# Patient Record
Sex: FEMALE | Race: WHITE | NOT HISPANIC OR LATINO | Employment: UNEMPLOYED | RURAL
[De-identification: names, ages, dates, MRNs, and addresses within clinical notes are randomized per-mention and may not be internally consistent; named-entity substitution may affect disease eponyms.]

---

## 2017-10-10 ENCOUNTER — OFFICE VISIT (OUTPATIENT)
Dept: FAMILY MEDICINE CLINIC | Facility: CLINIC | Age: 42
End: 2017-10-10

## 2017-10-10 ENCOUNTER — TELEPHONE (OUTPATIENT)
Dept: FAMILY MEDICINE CLINIC | Facility: CLINIC | Age: 42
End: 2017-10-10

## 2017-10-10 VITALS
RESPIRATION RATE: 20 BRPM | WEIGHT: 143 LBS | HEART RATE: 94 BPM | OXYGEN SATURATION: 96 % | BODY MASS INDEX: 21.18 KG/M2 | SYSTOLIC BLOOD PRESSURE: 120 MMHG | TEMPERATURE: 98.7 F | DIASTOLIC BLOOD PRESSURE: 78 MMHG | HEIGHT: 69 IN

## 2017-10-10 DIAGNOSIS — Z13.1 SCREENING FOR DIABETES MELLITUS: ICD-10-CM

## 2017-10-10 DIAGNOSIS — Z13.29 SCREENING FOR THYROID DISORDER: ICD-10-CM

## 2017-10-10 DIAGNOSIS — Z13.220 SCREENING FOR LIPOID DISORDERS: ICD-10-CM

## 2017-10-10 DIAGNOSIS — N94.6 DYSMENORRHEA: ICD-10-CM

## 2017-10-10 DIAGNOSIS — B35.1 TOENAIL FUNGUS: Primary | ICD-10-CM

## 2017-10-10 DIAGNOSIS — B95.8 STAPH INFECTION: ICD-10-CM

## 2017-10-10 DIAGNOSIS — Z86.79 HISTORY OF HEART FAILURE: ICD-10-CM

## 2017-10-10 PROCEDURE — 99203 OFFICE O/P NEW LOW 30 MIN: CPT | Performed by: NURSE PRACTITIONER

## 2017-10-10 RX ORDER — FLUCONAZOLE 150 MG/1
150 TABLET ORAL ONCE
Qty: 1 TABLET | Refills: 0 | Status: SHIPPED | OUTPATIENT
Start: 2017-10-10 | End: 2017-10-10

## 2017-10-10 RX ORDER — SULFAMETHOXAZOLE AND TRIMETHOPRIM 800; 160 MG/1; MG/1
TABLET ORAL
COMMUNITY
Start: 2017-10-06 | End: 2017-10-27

## 2017-10-10 RX ORDER — LEVOFLOXACIN 500 MG/1
500 TABLET, FILM COATED ORAL DAILY
Qty: 10 TABLET | Refills: 0 | Status: SHIPPED | OUTPATIENT
Start: 2017-10-10 | End: 2017-10-20

## 2017-10-16 NOTE — PROGRESS NOTES
Subjective   Arlin Og is a 42 y.o. female. She presents today to [facility].  She has several different concerns today in the office.  1. She has a severe fungal left great toenail.  It has been this way for years.  She typically covers it with a bandaid.  2. She suffers from heavy, painful menstrual cycles.  Hx of tubal ligation.    3. She has chronic staph infections.  Most recently on her left lower leg.  Was given Bactrim through the ER, but the symptoms are not improving.  4. Hx of CHF in the past.  Has not seen cardiology or been evaluated.  Due for fasting labs and PAP smear.     Lower Extremity Issue   This is a chronic problem. The current episode started more than 1 year ago. The problem occurs intermittently. The problem has been waxing and waning. Associated symptoms include abdominal pain and a rash. Pertinent negatives include no anorexia, change in bowel habit, chest pain, chills, congestion, coughing, diaphoresis, fatigue, fever, headaches, joint swelling, myalgias, nausea, neck pain, numbness, sore throat, swollen glands, urinary symptoms, vertigo, visual change, vomiting or weakness. The treatment provided no relief.        The following portions of the patient's history were reviewed and updated as appropriate: allergies, current medications, past family history, past medical history, past social history, past surgical history and problem list.    Review of Systems   Constitutional: Negative.  Negative for chills, diaphoresis, fatigue and fever.   HENT: Negative for congestion and sore throat.    Respiratory: Negative.  Negative for cough and shortness of breath.    Cardiovascular: Negative.  Negative for chest pain.   Gastrointestinal: Positive for abdominal pain. Negative for anorexia, change in bowel habit, nausea and vomiting.   Genitourinary: Positive for menstrual problem and pelvic pain. Negative for decreased urine volume, difficulty urinating, dyspareunia, dysuria, flank pain,  frequency, genital sores, hematuria, urgency, vaginal bleeding, vaginal discharge and vaginal pain.   Musculoskeletal: Negative for joint swelling, myalgias and neck pain.   Skin: Positive for color change and rash. Negative for pallor and wound.   Neurological: Negative for vertigo, weakness, numbness and headaches.       Objective   Physical Exam   Constitutional: She is oriented to person, place, and time. Vital signs are normal. She appears well-developed and well-nourished. No distress.   HENT:   Head: Normocephalic.   Right Ear: External ear normal.   Left Ear: External ear normal.   Nose: Nose normal.   Mouth/Throat: Oropharynx is clear and moist.   Eyes: EOM are normal. Pupils are equal, round, and reactive to light.   Neck: Normal range of motion. Neck supple. No JVD present. No thyromegaly present.   Cardiovascular: Normal rate and regular rhythm.  Exam reveals no gallop and no friction rub.    No murmur heard.  Pulmonary/Chest: Effort normal and breath sounds normal. No respiratory distress. She has no wheezes. She has no rales.   Musculoskeletal: Normal range of motion.        Neurological: She is alert and oriented to person, place, and time.   Skin: Skin is warm and dry. Lesion (LLE) noted. No rash noted. She is not diaphoretic. There is erythema (LLE). No pallor.   Psychiatric: She has a normal mood and affect. Her behavior is normal. Judgment and thought content normal.   Nursing note and vitals reviewed.      Assessment/Plan   Arlin was seen today for establish care.    Diagnoses and all orders for this visit:    Toenail fungus  -     Ambulatory Referral to Podiatry    History of heart failure  -     XR Chest PA & Lateral  -     Ambulatory Referral to Cardiology    Dysmenorrhea  -     CBC (No Diff)  -     Comprehensive Metabolic Panel  -     Vitamin D 25 Hydroxy  -     US non-ob transvaginal  -     Ambulatory Referral to Gynecology    Screening for diabetes mellitus  -     Hemoglobin  A1c    Screening for lipoid disorders  -     Lipid Panel    Screening for thyroid disorder  -     TSH    Staph infection  -     levoFLOXacin (LEVAQUIN) 500 MG tablet; Take 1 tablet by mouth Daily for 10 days.  -     mupirocin (BACTROBAN) 2 % ointment; Intranasally twice daily x 1 month  -     fluconazole (DIFLUCAN) 150 MG tablet; Take 1 tablet by mouth 1 (One) Time for 1 dose.    Plenty of fluids.  Finish all antibiotics.  Diflucan PRN yeast symptoms.  Bactroban intranasally twice daily x 1 month.  Fasting labs.  X-ray following office visit.  Referral to cardiology for cardiac work up.  Referral to podiatry for significant left great toenail fungus.  Referral to GYN for menstrual problems.  Continue current medications.  Follow up in 3 months for routine follow up.  Follow up sooner for problems/concerns.  Patient verbalized understanding and agreement with plan of care.        This document has been electronically signed by YULI West on October 16, 2017 10:52 AM

## 2017-10-27 ENCOUNTER — OFFICE VISIT (OUTPATIENT)
Dept: PODIATRY | Facility: CLINIC | Age: 42
End: 2017-10-27

## 2017-10-27 VITALS — HEIGHT: 69 IN | BODY MASS INDEX: 21.18 KG/M2 | WEIGHT: 143 LBS | HEART RATE: 43 BPM | OXYGEN SATURATION: 99 %

## 2017-10-27 DIAGNOSIS — B35.1 ONYCHOMYCOSIS: Primary | ICD-10-CM

## 2017-10-27 DIAGNOSIS — L60.2 ONYCHOGRYPHOSIS: ICD-10-CM

## 2017-10-27 DIAGNOSIS — M79.675 GREAT TOE PAIN, LEFT: ICD-10-CM

## 2017-10-27 PROCEDURE — 11750 EXCISION NAIL&NAIL MATRIX: CPT | Performed by: PODIATRIST

## 2017-10-27 PROCEDURE — 99203 OFFICE O/P NEW LOW 30 MIN: CPT | Performed by: PODIATRIST

## 2017-10-27 NOTE — PROGRESS NOTES
Arlin Og  1975  42 y.o. female    Patient presents today with a complaint of left great toenail being fungal and the toe is painful and does not have the ROM that is use to.    10/27/2017  Chief Complaint   Patient presents with   • Left Foot - toenail issue           History of Present Illness    Arlin Og is a 42 y.o.female presents to clinic today with chief complaint of left great toe pain.  Pain is located to the great toenail.  States that approximately 1 year ago she dropped something very heavy on it.  Ever since that time the nail has not grown properly.  A gross thickened, discolored and crumbly.  It occasionally falls off.  She describes the pain as dull.  She rates it as a 3 out of 10.  She has done nothing to treat it besides wear a Band-Aid.  She has no other pedal complaints.          Past Medical History:   Diagnosis Date   • Coronary artery disease    • History of CHF (congestive heart failure)    • Pre-eclampsia          Past Surgical History:   Procedure Laterality Date   •  SECTION     • TUBAL ABDOMINAL LIGATION           Family History   Problem Relation Age of Onset   • Atrial fibrillation Mother    • Anxiety disorder Mother    • Hyperlipidemia Mother    • Depression Mother    • Hypertension Mother    • Obesity Mother    • Heart disease Mother    • Anxiety disorder Father    • Hypertension Father        No Known Allergies    Social History     Social History   • Marital status:      Spouse name: N/A   • Number of children: N/A   • Years of education: N/A     Occupational History   • Not on file.     Social History Main Topics   • Smoking status: Current Every Day Smoker     Packs/day: 0.50     Types: Cigarettes   • Smokeless tobacco: Never Used   • Alcohol use Yes      Comment: Occassionally   • Drug use: Yes     Special: Marijuana   • Sexual activity: Defer     Other Topics Concern   • Not on file     Social History Narrative         Current Outpatient  "Prescriptions   Medication Sig Dispense Refill   • mupirocin (BACTROBAN) 2 % ointment Intranasally twice daily x 1 month 60 g 1     No current facility-administered medications for this visit.          OBJECTIVE    Pulse (!) 43  Ht 69\" (175.3 cm)  Wt 143 lb (64.9 kg)  LMP 10/10/2017  SpO2 99%  BMI 21.12 kg/m2      Review of Systems   Constitutional: Positive for fatigue.   Musculoskeletal: Positive for arthralgias and joint swelling.        Foot pain  Joint pain   Neurological: Positive for numbness.   All other systems reviewed and are negative.        Constitutional: well developed, well nourished    HEENT: Normocephalic and atraumatic, normal hearing    Respiratory: Non labored respirations noted    LLE Exam:    Cardiovascular:    DP/PT pulses palpable    CFT brisk  to all digits  Skin temp is warm to warm from proximal tibia to distal digits  Pedal hair growth present.   No erythema or edema noted     Musculoskeletal:  Muscle strength is 5/5 for all muscle groups tested   ROM of the 1st MTP is full without pain or crepitus  ROM of the MTJ is full without pain or crepitus    ROM of the STJ is full without pain or crepitus    ROM of the ankle joint is full without pain or crepitus    POP to left hallux toenail  Rectus foot type     Dermatological:   Left hallux nail is thickened, discolored  Skin is warm, dry and intact    Webspaces 1-4 bilateral are clean, dry and intact.   No subcutaneous nodules or masses noted    No open wounds noted     Neurological:   Sensation intact to light touch    DTR intact    Psychiatric: A&O x 3 with normal mood and affect. NAD.       Nail Removal  Date/Time: 10/27/2017 2:06 PM  Performed by: CAROLIN LEROY  Authorized by: CAROLIN LEROY   Location: left foot  Location details: left big toe  Anesthesia: digital block    Anesthesia:  Local Anesthetic: lidocaine 2% without epinephrine    Sedation:  Patient sedated: no  Preparation: skin prepped with Betadine  Amount removed: " complete (Nail plate was loosened from underlying nailbed with blunt dissection and removed with a hemostat)  Nail matrix removed: complete (Phenol)  Removed nail replaced and anchored: no  Dressing: antibiotic ointment and dressing applied  Patient tolerance: Patient tolerated the procedure well with no immediate complications              ASSESSMENT AND PLAN    Arlin was seen today for toenail issue.    Diagnoses and all orders for this visit:    Onychomycosis    Onychogryphosis    Great toe pain, left    - Comprehensive foot and ankle exam performed  - Diagnosis, prevention and treatment of fungal toenails discussed with patient, including risks and potential benefits of nail avulsion both temporary and permanent versus simple debridement vs biopsy and tx with oral antifungals  - Patient elected for a total permanent nail avulsion  - Dispensed aftercare instruction sheet  - All questions were answered and the patient is in agreement with the current treatment plan.  - RTC in 2 weeks          This document has been electronically signed by Earle King DPM on October 27, 2017 2:04 PM     10/27/2017  2:04 PM

## 2017-11-01 ENCOUNTER — OFFICE VISIT (OUTPATIENT)
Dept: FAMILY MEDICINE CLINIC | Facility: CLINIC | Age: 42
End: 2017-11-01

## 2017-11-01 VITALS
OXYGEN SATURATION: 96 % | RESPIRATION RATE: 20 BRPM | WEIGHT: 146.5 LBS | TEMPERATURE: 98.1 F | HEART RATE: 80 BPM | HEIGHT: 69 IN | DIASTOLIC BLOOD PRESSURE: 78 MMHG | SYSTOLIC BLOOD PRESSURE: 120 MMHG | BODY MASS INDEX: 21.7 KG/M2

## 2017-11-01 DIAGNOSIS — Z23 NEEDS FLU SHOT: ICD-10-CM

## 2017-11-01 DIAGNOSIS — N94.6 DYSMENORRHEA: ICD-10-CM

## 2017-11-01 DIAGNOSIS — Z86.79 HISTORY OF HEART FAILURE: ICD-10-CM

## 2017-11-01 DIAGNOSIS — Z91.89 PNEUMOCOCCAL VACCINATION INDICATED: ICD-10-CM

## 2017-11-01 DIAGNOSIS — B95.8 STAPH INFECTION: Primary | ICD-10-CM

## 2017-11-01 PROCEDURE — 90471 IMMUNIZATION ADMIN: CPT | Performed by: NURSE PRACTITIONER

## 2017-11-01 PROCEDURE — 99214 OFFICE O/P EST MOD 30 MIN: CPT | Performed by: NURSE PRACTITIONER

## 2017-11-01 PROCEDURE — 90472 IMMUNIZATION ADMIN EACH ADD: CPT | Performed by: NURSE PRACTITIONER

## 2017-11-01 PROCEDURE — 90686 IIV4 VACC NO PRSV 0.5 ML IM: CPT | Performed by: NURSE PRACTITIONER

## 2017-11-01 PROCEDURE — 90732 PPSV23 VACC 2 YRS+ SUBQ/IM: CPT | Performed by: NURSE PRACTITIONER

## 2017-11-01 NOTE — PATIENT INSTRUCTIONS
Preventive Care 40-64 Years, Female  Preventive care refers to lifestyle choices and visits with your health care provider that can promote health and wellness.  WHAT DOES PREVENTIVE CARE INCLUDE?  · A yearly physical exam. This is also called an annual well check.  · Dental exams once or twice a year.  · Routine eye exams. Ask your health care provider how often you should have your eyes checked.  · Personal lifestyle choices, including:    Daily care of your teeth and gums.    Regular physical activity.    Eating a healthy diet.    Avoiding tobacco and drug use.    Limiting alcohol use.    Practicing safe sex.    Taking low-dose aspirin daily starting at age 50.    Taking vitamin and mineral supplements as recommended by your health care provider.  WHAT HAPPENS DURING AN ANNUAL WELL CHECK?  The services and screenings done by your health care provider during your annual well check will depend on your age, overall health, lifestyle risk factors, and family history of disease.  Counseling  Your health care provider may ask you questions about your:  · Alcohol use.  · Tobacco use.  · Drug use.  · Emotional well-being.  · Home and relationship well-being.  · Sexual activity.  · Eating habits.  · Work and work environment.  · Method of birth control.  · Menstrual cycle.  · Pregnancy history.  Screening  You may have the following tests or measurements:  · Height, weight, and BMI.  · Blood pressure.  · Lipid and cholesterol levels. These may be checked every 5 years, or more frequently if you are over 50 years old.  · Skin check.  · Lung cancer screening. You may have this screening every year starting at age 55 if you have a 30-pack-year history of smoking and currently smoke or have quit within the past 15 years.  · Fecal occult blood test (FOBT) of the stool. You may have this test every year starting at age 50.  · Flexible sigmoidoscopy or colonoscopy. You may have a sigmoidoscopy every 5 years or a colonoscopy  every 10 years starting at age 50.  · Hepatitis C blood test.  · Hepatitis B blood test.  · Sexually transmitted disease (STD) testing.  · Diabetes screening. This is done by checking your blood sugar (glucose) after you have not eaten for a while (fasting). You may have this done every 1-3 years.  · Mammogram. This may be done every 1-2 years. Talk to your health care provider about when you should start having regular mammograms. This may depend on whether you have a family history of breast cancer.  · BRCA-related cancer screening. This may be done if you have a family history of breast, ovarian, tubal, or peritoneal cancers.  · Pelvic exam and Pap test. This may be done every 3 years starting at age 21. Starting at age 30, this may be done every 5 years if you have a Pap test in combination with an HPV test.  · Bone density scan. This is done to screen for osteoporosis. You may have this scan if you are at high risk for osteoporosis.  Discuss your test results, treatment options, and if necessary, the need for more tests with your health care provider.  Vaccines   Your health care provider may recommend certain vaccines, such as:  · Influenza vaccine. This is recommended every year.  · Tetanus, diphtheria, and acellular pertussis (Tdap, Td) vaccine. You may need a Td booster every 10 years.  · Varicella vaccine. You may need this if you have not been vaccinated.  · Zoster vaccine. You may need this after age 60.  · Measles, mumps, and rubella (MMR) vaccine. You may need at least one dose of MMR if you were born in 1957 or later. You may also need a second dose.  · Pneumococcal 13-valent conjugate (PCV13) vaccine. You may need this if you have certain conditions and were not previously vaccinated.  · Pneumococcal polysaccharide (PPSV23) vaccine. You may need one or two doses if you smoke cigarettes or if you have certain conditions.  · Meningococcal vaccine. You may need this if you have certain  conditions.  · Hepatitis A vaccine. You may need this if you have certain conditions or if you travel or work in places where you may be exposed to hepatitis A.  · Hepatitis B vaccine. You may need this if you have certain conditions or if you travel or work in places where you may be exposed to hepatitis B.  · Haemophilus influenzae type b (Hib) vaccine. You may need this if you have certain conditions.  Talk to your health care provider about which screenings and vaccines you need and how often you need them.     This information is not intended to replace advice given to you by your health care provider. Make sure you discuss any questions you have with your health care provider.     Document Released: 01/13/2017 Document Reviewed: 01/13/2017  [employer] Interactive Patient Education ©2017 [employer] Inc.  Pneumococcal Vaccine, Polyvalent solution for injection  What is this medicine?  PNEUMOCOCCAL VACCINE, POLYVALENT (RACHANA mo LEOLA al vak SEEN, chari ee WALTER rose) is a vaccine to prevent pneumococcus bacteria infection. These bacteria are a major cause of ear infections, Strep throat infections, and serious pneumonia, meningitis, or blood infections worldwide. These vaccines help the body to produce antibodies (protective substances) that help your body defend against these bacteria. This vaccine is recommended for people 2 years of age and older with health problems. It is also recommended for all adults over 50 years old. This vaccine will not treat an infection.  This medicine may be used for other purposes; ask your health care provider or pharmacist if you have questions.  COMMON BRAND NAME(S): Pneumovax 23  What should I tell my health care provider before I take this medicine?  They need to know if you have any of these conditions:  -bleeding problems  -bone marrow or organ transplant  -cancer, Hodgkin's disease  -fever  -infection  -immune system problems  -low platelet count in the blood  -seizures  -an unusual  or allergic reaction to pneumococcal vaccine, diphtheria toxoid, other vaccines, latex, other medicines, foods, dyes, or preservatives  -pregnant or trying to get pregnant  -breast-feeding  How should I use this medicine?  This vaccine is for injection into a muscle or under the skin. It is given by a health care professional.  A copy of Vaccine Information Statements will be given before each vaccination. Read this sheet carefully each time. The sheet may change frequently.  Talk to your pediatrician regarding the use of this medicine in children. While this drug may be prescribed for children as young as 2 years of age for selected conditions, precautions do apply.  Overdosage: If you think you have taken too much of this medicine contact a poison control center or emergency room at once.  NOTE: This medicine is only for you. Do not share this medicine with others.  What if I miss a dose?  It is important not to miss your dose. Call your doctor or health care professional if you are unable to keep an appointment.  What may interact with this medicine?  -medicines for cancer chemotherapy  -medicines that suppress your immune function  -medicines that treat or prevent blood clots like warfarin, enoxaparin, and dalteparin  -steroid medicines like prednisone or cortisone  This list may not describe all possible interactions. Give your health care provider a list of all the medicines, herbs, non-prescription drugs, or dietary supplements you use. Also tell them if you smoke, drink alcohol, or use illegal drugs. Some items may interact with your medicine.  What should I watch for while using this medicine?  Mild fever and pain should go away in 3 days or less. Report any unusual symptoms to your doctor or health care professional.  What side effects may I notice from receiving this medicine?  Side effects that you should report to your doctor or health care professional as soon as possible:  -allergic reactions like  skin rash, itching or hives, swelling of the face, lips, or tongue  -breathing problems  -confused  -fever over 102 degrees F  -pain, tingling, numbness in the hands or feet  -seizures  -unusual bleeding or bruising  -unusual muscle weakness  Side effects that usually do not require medical attention (report to your doctor or health care professional if they continue or are bothersome):  -aches and pains  -diarrhea  -fever of 102 degrees F or less  -headache  -irritable  -loss of appetite  -pain, tender at site where injected  -trouble sleeping  This list may not describe all possible side effects. Call your doctor for medical advice about side effects. You may report side effects to FDA at 4-480-FDA-9432.  Where should I keep my medicine?  This does not apply. This vaccine is given in a clinic, pharmacy, doctor's office, or other health care setting and will not be stored at home.  NOTE: This sheet is a summary. It may not cover all possible information. If you have questions about this medicine, talk to your doctor, pharmacist, or health care provider.     © 2017, Elsevier/[employer] Standard. (2009-07-24 14:32:37)  Influenza Virus Vaccine injection (Fluarix)  What is this medicine?  INFLUENZA VIRUS VACCINE (in floo EN [facility] SEEN) helps to reduce the risk of getting influenza also known as the flu.  This medicine may be used for other purposes; ask your health care provider or pharmacist if you have questions.  COMMON BRAND NAME(S): Fluarix, Fluzone  What should I tell my health care provider before I take this medicine?  They need to know if you have any of these conditions:  -bleeding disorder like hemophilia  -fever or infection  -Guillain-[place] syndrome or other neurological problems  -immune system problems  -infection with the human immunodeficiency virus (HIV) or AIDS  -low blood platelet counts  -multiple sclerosis  -an unusual or allergic reaction to influenza virus vaccine, eggs, chicken proteins,  latex, gentamicin, other medicines, foods, dyes or preservatives  -pregnant or trying to get pregnant  -breast-feeding  How should I use this medicine?  This vaccine is for injection into a muscle. It is given by a health care professional.  A copy of Vaccine Information Statements will be given before each vaccination. Read this sheet carefully each time. The sheet may change frequently.  Talk to your pediatrician regarding the use of this medicine in children. Special care may be needed.  Overdosage: If you think you have taken too much of this medicine contact a poison control center or emergency room at once.  NOTE: This medicine is only for you. Do not share this medicine with others.  What if I miss a dose?  This does not apply.  What may interact with this medicine?  -chemotherapy or radiation therapy  -medicines that lower your immune system like etanercept, anakinra, infliximab, and adalimumab  -medicines that treat or prevent blood clots like warfarin  -phenytoin  -steroid medicines like prednisone or cortisone  -theophylline  -vaccines  This list may not describe all possible interactions. Give your health care provider a list of all the medicines, herbs, non-prescription drugs, or dietary supplements you use. Also tell them if you smoke, drink alcohol, or use illegal drugs. Some items may interact with your medicine.  What should I watch for while using this medicine?  Report any side effects that do not go away within 3 days to your doctor or health care professional. Call your health care provider if any unusual symptoms occur within 6 weeks of receiving this vaccine.  You may still catch the flu, but the illness is not usually as bad. You cannot get the flu from the vaccine. The vaccine will not protect against colds or other illnesses that may cause fever. The vaccine is needed every year.  What side effects may I notice from receiving this medicine?  Side effects that you should report to your  doctor or health care professional as soon as possible:  -allergic reactions like skin rash, itching or hives, swelling of the face, lips, or tongue  Side effects that usually do not require medical attention (report to your doctor or health care professional if they continue or are bothersome):  -fever  -headache  -muscle aches and pains  -pain, tenderness, redness, or swelling at site where injected  -weak or tired  This list may not describe all possible side effects. Call your doctor for medical advice about side effects. You may report side effects to FDA at 6-521-FDA-0496.  Where should I keep my medicine?  This vaccine is only given in a clinic, pharmacy, doctor's office, or other health care setting and will not be stored at home.  NOTE: This sheet is a summary. It may not cover all possible information. If you have questions about this medicine, talk to your doctor, pharmacist, or health care provider.     © 2017, [employer] Standard. (2009-07-15 09:30:40)

## 2017-11-02 NOTE — PROGRESS NOTES
Subjective   Arlin Og is a 42 y.o. female. She presents today for her routine follow up on chronic medical problems.  Has not had her fasting labs completed yet.  She did see Dr. King who removed her left great toenail.  She would like to receive her flu and pneumonia vaccines today in the office.    Lower Extremity Issue   This is a chronic problem. The current episode started more than 1 year ago. The problem occurs constantly. Pertinent negatives include no abdominal pain, anorexia, arthralgias, change in bowel habit, chest pain, chills, congestion, coughing, diaphoresis, fatigue, fever, headaches, joint swelling, myalgias, nausea, neck pain, numbness, rash, sore throat, swollen glands, urinary symptoms, vertigo, visual change, vomiting or weakness. Nothing aggravates the symptoms. The treatment provided significant relief.        The following portions of the patient's history were reviewed and updated as appropriate: allergies, current medications, past family history, past medical history, past social history, past surgical history and problem list.    Review of Systems   Constitutional: Negative.  Negative for chills, diaphoresis, fatigue and fever.   HENT: Negative for congestion and sore throat.    Respiratory: Negative.  Negative for cough and shortness of breath.    Cardiovascular: Negative.  Negative for chest pain and palpitations.   Gastrointestinal: Negative for abdominal pain, anorexia, change in bowel habit, nausea and vomiting.   Musculoskeletal: Negative for arthralgias, joint swelling, myalgias and neck pain.   Skin: Negative.  Negative for rash.   Neurological: Negative for vertigo, weakness, numbness and headaches.       Objective   Physical Exam   Constitutional: She is oriented to person, place, and time. Vital signs are normal. She appears well-developed and well-nourished. No distress.   HENT:   Head: Normocephalic.   Right Ear: External ear normal.   Left Ear: External ear  normal.   Nose: Nose normal.   Mouth/Throat: Oropharynx is clear and moist.   Eyes: EOM are normal. Pupils are equal, round, and reactive to light.   Neck: Normal range of motion. Neck supple. No JVD present. No thyromegaly present.   Cardiovascular: Normal rate and regular rhythm.  Exam reveals no gallop and no friction rub.    No murmur heard.  Pulmonary/Chest: Effort normal and breath sounds normal. No respiratory distress. She has no wheezes. She has no rales.   Musculoskeletal: Normal range of motion.   Neurological: She is alert and oriented to person, place, and time.   Skin: Skin is warm and dry. No rash noted. She is not diaphoretic. No erythema. No pallor.   Psychiatric: She has a normal mood and affect. Her behavior is normal. Judgment and thought content normal.   Nursing note and vitals reviewed.      Assessment/Plan   Arlin was seen today for follow-up.    Diagnoses and all orders for this visit:    Staph infection    Dysmenorrhea    History of heart failure    Needs flu shot  -     Flu Vaccine Quad PF 3YR+ (FLUARIX/FLUZONE [id number])    Pneumococcal vaccination indicated  -     Pneumococcal Polysaccharide Vaccine 23-Valent Greater Than or Equal To 1yo Subcutaneous / IM    Flu and pneumonia vaccine today in the office.  Follow up with specialists as scheduled.  Continue current medications.  Follow up in 6 months for routine follow up.  Follow up sooner for problems/concerns.  Patient verbalized understanding and agreement with plan of care.        This document has been electronically signed by YULI West on November 2, 2017 1:17 PM

## 2017-11-29 DIAGNOSIS — Z86.79 HISTORY OF HEART FAILURE: Primary | ICD-10-CM

## 2018-05-02 ENCOUNTER — TELEPHONE (OUTPATIENT)
Dept: FAMILY MEDICINE CLINIC | Facility: CLINIC | Age: 43
End: 2018-05-02

## 2018-05-02 NOTE — TELEPHONE ENCOUNTER
----- Message from YULI West sent at 4/30/2018 11:45 AM CDT -----  Regarding: RE:  Looks under lab and if they were ordered in the last year they should be current.  ----- Message -----  From: Sherly Carcamo MA  Sent: 4/30/2018  11:43 AM  To: YULI West    Are her lab orders still current Pt would like to come in on Thursday to get them.